# Patient Record
Sex: MALE | Race: WHITE | NOT HISPANIC OR LATINO | Employment: OTHER | URBAN - METROPOLITAN AREA
[De-identification: names, ages, dates, MRNs, and addresses within clinical notes are randomized per-mention and may not be internally consistent; named-entity substitution may affect disease eponyms.]

---

## 2018-05-30 ENCOUNTER — APPOINTMENT (EMERGENCY)
Dept: RADIOLOGY | Facility: HOSPITAL | Age: 53
End: 2018-05-30
Payer: MEDICARE

## 2018-05-30 ENCOUNTER — HOSPITAL ENCOUNTER (EMERGENCY)
Facility: HOSPITAL | Age: 53
Discharge: HOME | End: 2018-05-30
Attending: EMERGENCY MEDICINE | Admitting: EMERGENCY MEDICINE
Payer: MEDICARE

## 2018-05-30 ENCOUNTER — APPOINTMENT (EMERGENCY)
Dept: CT IMAGING | Facility: HOSPITAL | Age: 53
End: 2018-05-30
Payer: MEDICARE

## 2018-05-30 VITALS
HEART RATE: 80 BPM | HEIGHT: 72 IN | SYSTOLIC BLOOD PRESSURE: 154 MMHG | OXYGEN SATURATION: 95 % | BODY MASS INDEX: 29.12 KG/M2 | WEIGHT: 215 LBS | TEMPERATURE: 98.6 F | RESPIRATION RATE: 96 BRPM | DIASTOLIC BLOOD PRESSURE: 76 MMHG

## 2018-05-30 DIAGNOSIS — S93.402A LEFT ANKLE SPRAIN: ICD-10-CM

## 2018-05-30 DIAGNOSIS — M54.50 ACUTE LOW BACK PAIN: ICD-10-CM

## 2018-05-30 DIAGNOSIS — S29.012A UPPER BACK STRAIN: ICD-10-CM
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
## 2018-05-30 LAB
ANION GAP BLD CALC-SCNC: 18 MMOL/L (ref 4–13)
BUN BLD-MCNC: 6 MG/DL (ref 5–25)
CA-I BLD-SCNC: 1.14 MMOL/L (ref 1.12–1.32)
CHLORIDE BLD-SCNC: 101 MMOL/L (ref 100–108)
CREAT BLD-MCNC: 0.9 MG/DL (ref 0.6–1.3)
GFR SERPL CREATININE-BSD FRML MDRD: 96 ML/MIN/1.73SQ M
GLUCOSE SERPL-MCNC: 99 MG/DL (ref 65–140)
HCT VFR BLD CALC: 48 % (ref 36.5–49.3)
HGB BLDA-MCNC: 16.3 G/DL (ref 12–17)
PCO2 BLD: 28 MMOL/L (ref 21–32)
POTASSIUM BLD-SCNC: 4 MMOL/L (ref 3.5–5.3)
SODIUM BLD-SCNC: 142 MMOL/L (ref 136–145)
SPECIMEN SOURCE: ABNORMAL

## 2018-05-30 PROCEDURE — 74177 CT ABD & PELVIS W/CONTRAST: CPT

## 2018-05-30 PROCEDURE — 73610 X-RAY EXAM OF ANKLE: CPT

## 2018-05-30 PROCEDURE — 70450 CT HEAD/BRAIN W/O DYE: CPT

## 2018-05-30 PROCEDURE — 99284 EMERGENCY DEPT VISIT MOD MDM: CPT

## 2018-05-30 PROCEDURE — 73630 X-RAY EXAM OF FOOT: CPT

## 2018-05-30 PROCEDURE — 80047 BASIC METABLC PNL IONIZED CA: CPT

## 2018-05-30 PROCEDURE — 85014 HEMATOCRIT: CPT

## 2018-05-30 PROCEDURE — 71260 CT THORAX DX C+: CPT

## 2018-05-30 PROCEDURE — 96374 THER/PROPH/DIAG INJ IV PUSH: CPT

## 2018-05-30 PROCEDURE — 72125 CT NECK SPINE W/O DYE: CPT

## 2018-05-30 RX ORDER — CYCLOBENZAPRINE HCL 10 MG
10 TABLET ORAL 3 TIMES DAILY PRN
Qty: 15 TABLET | Refills: 0 | Status: SHIPPED | OUTPATIENT
Start: 2018-05-30

## 2018-05-30 RX ADMIN — HYDROMORPHONE HYDROCHLORIDE 0.5 MG: 1 INJECTION, SOLUTION INTRAMUSCULAR; INTRAVENOUS; SUBCUTANEOUS at 14:41

## 2018-05-30 RX ADMIN — IOHEXOL 100 ML: 350 INJECTION, SOLUTION INTRAVENOUS at 15:20

## 2018-05-30 NOTE — ED PROVIDER NOTES
History  Chief Complaint   Patient presents with    Back Pain     Pt reports fall Saturday on steps, denies striking head, reports c/o left ankle pain and right sided back pain     58-year-old male patient here for evaluation of a fall down steps  This occurred 3 days ago  He was walking up the steps when he lost his footing and tripped falling down about 5-6 steps  He states he was able to grab onto the railing with right shoulder but still fell all the way down  He does not remember hitting his head or loss of consciousness but does have a headache  No visual disturbances  He is midline neck pain, right-sided mid thoracic pain  He has pain with deep inspiration  He has lower back pain  No urinary incontinence, saddle anesthesia, difficulty urinating  No history of malignancy  Does not take any anticoagulants or antiplatelets  He also has left ankle pain  He has pain with weight-bearing  He has been ambulating without difficulty however  No numbness or tingling or weakness in his legs  Past medical history of hypertension and RSD  History provided by:  Patient   used: No    Fall   Mechanism of injury: fall    Injury location: Left ankle, left foot, lower back, upper back, neck, head  Incident location: home    Time since incident:  3 days  Arrived directly from scene: no    Fall:     Fall occurred:  Down stairs    Height of fall:  5-6    Impact surface:  Hard floor    Point of impact:  Outstretched arms and back    Entrapped after fall: no    Protective equipment: none    Suspicion of alcohol use: no    Suspicion of drug use: no    Tetanus status:  Unknown  Prior to arrival data:     Bystander interventions:  None  Associated symptoms: back pain, headaches and neck pain    Associated symptoms: no abdominal pain, no blindness, no chest pain, no difficulty breathing, no hearing loss, no loss of consciousness, no nausea, no seizures and no vomiting    Risk factors: no AICD, no anticoagulation therapy, no asthma, no beta blocker therapy, no CABG, no CAD, no CHF, no COPD, no diabetes, no dialysis, no hemophilia, no kidney disease, no pacemaker, no past MI and no steroid use        None       Past Medical History:   Diagnosis Date    Hypertension     RSD (reflex sympathetic dystrophy)        Past Surgical History:   Procedure Laterality Date    BACK SURGERY      SPINAL CORD STIMULATOR IMPLANT         History reviewed  No pertinent family history  I have reviewed and agree with the history as documented  Social History   Substance Use Topics    Smoking status: Never Smoker    Smokeless tobacco: Never Used    Alcohol use No        Review of Systems   Constitutional: Negative for activity change, appetite change, chills, diaphoresis, fatigue, fever and unexpected weight change  HENT: Negative for congestion, hearing loss, rhinorrhea, sinus pressure, sore throat and trouble swallowing  Eyes: Negative for blindness, photophobia and visual disturbance  Respiratory: Negative for apnea, cough, choking, chest tightness, shortness of breath, wheezing and stridor  Cardiovascular: Negative for chest pain, palpitations and leg swelling  Gastrointestinal: Negative for abdominal distention, abdominal pain, blood in stool, constipation, diarrhea, nausea and vomiting  Genitourinary: Negative for decreased urine volume, difficulty urinating, dysuria, enuresis, flank pain, frequency, hematuria and urgency  Musculoskeletal: Positive for back pain and neck pain  Negative for arthralgias, myalgias and neck stiffness  Left ankle pain   Skin: Negative for color change, pallor, rash and wound  Allergic/Immunologic: Negative  Neurological: Positive for headaches  Negative for dizziness, tremors, seizures, loss of consciousness, syncope, weakness, light-headedness and numbness  Hematological: Negative  Psychiatric/Behavioral: Negative      All other systems reviewed and are negative  Physical Exam  Physical Exam   Constitutional: He is oriented to person, place, and time  He appears well-developed and well-nourished  Non-toxic appearance  He does not have a sickly appearance  He does not appear ill  No distress  HENT:   Head: Normocephalic and atraumatic  Eyes: EOM and lids are normal  Pupils are equal, round, and reactive to light  Neck: Normal range of motion  Neck supple  Cardiovascular: Normal rate, regular rhythm, S1 normal, S2 normal, normal heart sounds, intact distal pulses and normal pulses  Exam reveals no gallop, no distant heart sounds, no friction rub and no decreased pulses  No murmur heard  Pulses:       Radial pulses are 2+ on the right side, and 2+ on the left side  Pulmonary/Chest: Effort normal and breath sounds normal  No accessory muscle usage  No apnea, no tachypnea and no bradypnea  No respiratory distress  He has no decreased breath sounds  He has no wheezes  He has no rhonchi  He has no rales  Abdominal: Soft  Normal appearance and bowel sounds are normal  He exhibits no distension and no mass  There is no tenderness  There is no rigidity, no rebound and no guarding  No hernia  Musculoskeletal: He exhibits no edema or deformity  Left ankle: He exhibits normal range of motion, no swelling, no ecchymosis, no deformity and normal pulse  Tenderness  Lateral malleolus and medial malleolus tenderness found  Cervical back: He exhibits tenderness, bony tenderness and pain  He exhibits normal range of motion, no swelling, no edema, no deformity and no laceration  Thoracic back: He exhibits tenderness and bony tenderness  Lumbar back: He exhibits tenderness, bony tenderness and pain  He exhibits normal range of motion, no swelling, no edema, no deformity and no laceration          Back:         Right foot: There is normal range of motion, no tenderness, no bony tenderness, no swelling, normal capillary refill, no crepitus and no deformity  Left foot: There is tenderness and bony tenderness  There is normal range of motion, no swelling, normal capillary refill, no crepitus and no deformity  Feet:    Neurological: He is alert and oriented to person, place, and time  No cranial nerve deficit  GCS eye subscore is 4  GCS verbal subscore is 5  GCS motor subscore is 6  GCS 15  AAOx3  Ambulating in department without difficulty  CN II-XII grossly intact  No focal neuro deficits  Skin: Skin is warm, dry and intact  No rash noted  He is not diaphoretic  No erythema  No pallor  Psychiatric: His speech is normal    Nursing note and vitals reviewed        Vital Signs  ED Triage Vitals [05/30/18 1321]   Temperature Pulse Respirations Blood Pressure SpO2   98 6 °F (37 °C) 100 20 167/88 98 %      Temp Source Heart Rate Source Patient Position - Orthostatic VS BP Location FiO2 (%)   Oral Monitor Sitting Left arm --      Pain Score       Worst Possible Pain           Vitals:    05/30/18 1321 05/30/18 1532   BP: 167/88 146/76   Pulse: 100 88   Patient Position - Orthostatic VS: Sitting Lying       Visual Acuity      ED Medications  Medications   HYDROmorphone (DILAUDID) injection 0 5 mg (0 5 mg Intravenous Given 5/30/18 1441)   iohexol (OMNIPAQUE) 350 MG/ML injection (MULTI-DOSE) 100 mL (100 mL Intravenous Given 5/30/18 1520)       Diagnostic Studies  Results Reviewed     Procedure Component Value Units Date/Time    POCT Chem 8+ [[id number]]  (Abnormal) Collected:  05/30/18 1446    Lab Status:  Final result Updated:  05/30/18 1450     SODIUM, I-STAT 142 mmol/l      Potassium, i-STAT 4 0 mmol/L      Chloride, istat 101 mmol/L      CO2, i-STAT 28 mmol/L      Anion Gap, Istat 18 (H) mmol/L      Calcium, Ionized i-STAT 1 14 mmol/L      BUN, I-STAT 6 mg/dl      Creatinine, i-STAT 0 9 mg/dl      eGFR 96 ml/min/1 73sq m      Glucose, i-STAT 99 mg/dl      Hct, i-STAT 48 %      Hgb, i-STAT 16 3 g/dl      Specimen Type VENOUS CT chest abdomen pelvis w contrast   Final Result by Singh Samuel MD (05/30 8165)         1  No acute CT findings  2   3 mm left lower lobe pulmonary nodule  Based on current Fleischner Society 2017 Guidelines on incidental pulmonary nodule, no routine follow-up is needed if the patient is considered low risk for lung cancer  If the patient is considered high risk    for lung cancer, 12 month follow-up non-contrast chest CT is recommended  Workstation performed: [id number]         CT spine cervical without contrast   Final Result by Singh Samuel MD (05/30 1529)      No acute fracture or dislocation  Workstation performed: [id number]         CT head without contrast   Final Result by Singh Samuel MD (05/30 1525)      No acute intracranial abnormality  Fungating structure in the left maxillary sinus could represent a mass/polyp  Correlate with outpatient nonemergent ENT  Workstation performed: [id number]         XR foot 3+ views LEFT   ED Interpretation by Justin Robles PA-C (05/30 1442)   No acute osseous abnormalities      Final Result by ISRAEL Ray MD (05/30 1439)      No acute osseous abnormality  Workstation performed: [id number]         XR ankle 3+ views LEFT   ED Interpretation by Justin Robles PA-C (05/30 1441)   No acute osseous abnormalities      Final Result by ISRAEL Ray MD (05/30 1439)      No acute osseous abnormality              Workstation performed: [id number]                    Procedures  Procedures       Phone Contacts  ED Phone Contact    ED Course                               MDM  Number of Diagnoses or Management Options  Acute low back pain: new and requires workup  Fall down steps: new and requires workup  Left ankle sprain: new and requires workup  Upper back strain: new and requires workup  Diagnosis management comments: DDx including but not limited to: intracranial injury, cervical injury, intrathoracic injury, intraabdominal injury, extremity injury  Plan:  Fall down multiple steps, multiple traumatic injuries  Will perform CT chest abdomen pelvis, CT head, CT neck, x-ray left ankle and foot  Disposition pending  Analgesia  Amount and/or Complexity of Data Reviewed  Clinical lab tests: ordered and reviewed  Tests in the radiology section of CPT®: ordered and reviewed  Independent visualization of images, tracings, or specimens: yes    Risk of Complications, Morbidity, and/or Mortality  Presenting problems: moderate  Management options: low  General comments: 20-year-old male patient presents to ER for fall down steps with multiple injuries  CT scan reveals no acute abnormalities  Was found have small lung nodule  His pain improved after Dilaudid  Likely contusions and sprain injuries  Laboratory evaluation unremarkable  He has been resting comfortably  Will discharge home with Flexeril for potential that this is muscle spasm from the injury itself  Follow up with his PCP in the next 3-5 days for re-evaluation of his pain and symptoms  Return to ER for worsening symptoms  Patient understands and agrees with the plan  Patient Progress  Patient progress: stable    CritCare Time    Disposition  Final diagnoses:   Fall down steps   Left ankle sprain   Upper back strain   Acute low back pain     Time reflects when diagnosis was documented in both MDM as applicable and the Disposition within this note     Time User Action Codes Description Comment    5/30/2018  4:12 PM Viridiana Nunezita [place] Fall down steps     5/30/2018  4:13 PM Viridiana Smith Add [S62 038E] Left ankle sprain     5/30/2018  4:13 PM Viridiana Smith Add [F96 740R] Upper back strain     5/30/2018  4:13 PM Saritha Stinson Add [M54 5] Acute low back pain       ED Disposition     ED Disposition Condition Comment    Discharge  Vladimir Dean discharge to home/self care      Condition at discharge: Good        Follow-up Information     Follow up With Specialties Details Why Lizett Woodson MD  Call in 1 day for follow up in 3-5 days to re-evaluate your pain and symptoms [address]  [phone]            Patient's Medications   Discharge Prescriptions    CYCLOBENZAPRINE (FLEXERIL) 10 MG TABLET    Take 1 tablet (10 mg total) by mouth 3 (three) times a day as needed for muscle spasms       Start Date: 5/30/2018 End Date: --       Order Dose: 10 mg       Quantity: 15 tablet    Refills: 0     No discharge procedures on file      ED Provider  Electronically Signed by           Oswald Oconnell PA-C  05/30/18 8671

## 2018-05-30 NOTE — ED NOTES
D/c reviewed with pt prior to discharge  Medications discussed  Ambulatory off unit with steady gait        Patricia Merino RN  89/11/37 0552

## 2018-05-30 NOTE — DISCHARGE INSTRUCTIONS
Return to the Emergency Department sooner if increased pain, swelling, numbness, weakness, vomiting, difficulty breathing, redness  Ice, elevate  Acute Low Back Pain   WHAT YOU NEED TO KNOW:   What is acute low back pain? Acute low back pain is sudden discomfort in your lower back area that lasts for up to 6 weeks  The discomfort makes it difficult to tolerate activity  What causes or increases my risk for acute low back pain? Conditions that affect the spine, joints, or muscles can cause back pain  These may include arthritis, spinal stenosis (narrowing of the spinal column), muscle tension, or breakdown of the spinal discs  The following increase your risk of back pain:  · Repeated bending, lifting, or twisting, or lifting heavy items    · Injury from a fall or accident    · Lack of regular physical activity     · Obesity, pregnancy     · Smoking    · Aging    · Driving, sitting, or standing for long periods    · Bad posture while sitting or standing  How is acute low back pain diagnosed? Your healthcare provider will ask about your medical history and examine you  He may ask when you last had low back pain and how it started  Show him where you feel the pain and what makes it feel better or worse  Tell him about the type of pain you have, how bad it is, and how long it lasts  Tell him if your pain worsens at night or when you lie down on your back  How is acute low back pain treated? The goal of treatment is to relieve your pain and help you tolerate activity  Most people with acute lower back pain get better within 4 to 6 weeks  You may need any of the following:  · Medicines:      ¨ Acetaminophen  decreases pain  It is available without a doctor's order  Ask how much to take and how often to take it  Follow directions  Acetaminophen can cause liver damage if not taken correctly  ¨ NSAIDs  help decrease swelling and pain  This medicine is available with or without a doctor's order   NSAIDs can cause stomach bleeding or kidney problems in certain people  If you take blood thinner medicine, always ask your healthcare provider if NSAIDs are safe for you  Always read the medicine label and follow directions  ¨ Prescription pain medicine  may be given  Ask your healthcare provider how to take this medicine safely  ¨ Muscle relaxers  decrease pain by relaxing the muscles in your lower spine  What can I do to prevent low back pain? · Use proper body mechanics  ¨ Bend at the hips and knees when you  objects  Do not bend from the waist  Use your leg muscles as you lift the load  Do not use your back  Keep the object close to your chest as you lift it  Try not to twist or lift anything above your waist     ¨ Change your position often when you stand for long periods of time  Rest one foot on a small box or footrest, and then switch to the other foot often  ¨ Try not to sit for long periods of time  When you do, sit in a straight-backed chair with your feet flat on the floor  Never reach, pull, or push while you are sitting  · Do exercises that strengthen your back muscles  Warm up before you exercise  Ask your healthcare provider the best exercises for you  · Maintain a healthy weight  Ask your healthcare provider how much you should weigh  Ask him to help you create a weight loss plan if you are overweight  How can I take care of myself if I have low back pain? · Stay active  as much as you can without causing more pain  Bed rest could make your back pain worse  Start with some light exercises such as walking  Avoid heavy lifting until your pain is gone  Ask for more information about the activities or exercises that are right for you  · Ice  helps decrease swelling, pain, and muscle spams  Put crushed ice in a plastic bag  Cover it with a towel  Place it on your lower back for 20 to 30 minutes every 2 hours   Do this for about 2 to 3 days after your pain starts, or as directed  · Heat  helps decrease pain and muscle spasms  Start to use heat after treatment with ice has stopped  Use a small towel dampened with warm water or a heating pad, or sit in a warm bath  Apply heat on the area for 20 to 30 minutes every 2 hours for as many days as directed  Alternate heat and ice  When should I contact my healthcare provider? · You have a fever  · You have pain at night or when you rest     · Your pain does not get better with treatment  · You have pain that worsens when you cough or sneeze  · You suddenly feel something pop or snap in your back  · You have questions or concerns about your condition or care  When should I seek immediate care or call 911? · You have severe pain  · You have sudden stiffness and heaviness on both buttocks down to both legs  · You have numbness or weakness in one leg, or pain in both legs  · You have numbness in your genital area or across your lower back  · You cannot control your urine or bowel movements  CARE AGREEMENT:   You have the right to help plan your care  Learn about your health condition and how it may be treated  Discuss treatment options with your caregivers to decide what care you want to receive  You always have the right to refuse treatment  The above information is an  only  It is not intended as medical advice for individual conditions or treatments  Talk to your doctor, nurse or pharmacist before following any medical regimen to see if it is safe and effective for you  © 2017 [employer]  Information is for End User's use only and may not be sold, redistributed or otherwise used for commercial purposes  All illustrations and images included in CareNotes® are the copyrighted property of [facility] [employer]  or [employer]

## 2018-06-04 ENCOUNTER — TELEPHONE (OUTPATIENT)
Dept: PAIN MEDICINE | Facility: MEDICAL CENTER | Age: 53
End: 2018-06-04

## 2018-06-04 NOTE — TELEPHONE ENCOUNTER
Patients wife called to schedule  Intake completed and sent to ES office  Patient being referred from the ED for Back pain  Will arrive early to complete forms

## 2018-06-05 RX ORDER — EPINEPHRINE 0.3 MG/.3ML
INJECTION SUBCUTANEOUS
Refills: 1 | COMMUNITY
Start: 2018-03-27

## 2018-06-05 RX ORDER — NEOMYCIN SULFATE, POLYMYXIN B SULFATE, AND DEXAMETHASONE 3.5; 10000; 1 MG/G; [USP'U]/G; MG/G
OINTMENT OPHTHALMIC
COMMUNITY
Start: 2018-05-14 | End: 2018-06-06 | Stop reason: ALTCHOICE

## 2018-06-06 ENCOUNTER — CONSULT (OUTPATIENT)
Dept: PAIN MEDICINE | Facility: CLINIC | Age: 53
End: 2018-06-06
Payer: MEDICARE

## 2018-06-06 VITALS
HEIGHT: 71 IN | WEIGHT: 216 LBS | RESPIRATION RATE: 16 BRPM | SYSTOLIC BLOOD PRESSURE: 154 MMHG | BODY MASS INDEX: 30.24 KG/M2 | DIASTOLIC BLOOD PRESSURE: 80 MMHG | HEART RATE: 84 BPM

## 2018-06-06 DIAGNOSIS — M48.062 SPINAL STENOSIS OF LUMBAR REGION WITH NEUROGENIC CLAUDICATION: Primary | ICD-10-CM

## 2018-06-06 DIAGNOSIS — M48.02 CERVICAL SPINAL STENOSIS: ICD-10-CM

## 2018-06-06 PROCEDURE — 99204 OFFICE O/P NEW MOD 45 MIN: CPT | Performed by: ANESTHESIOLOGY

## 2018-06-06 RX ORDER — TRAMADOL HYDROCHLORIDE 50 MG/1
50 TABLET ORAL EVERY 8 HOURS PRN
Qty: 21 TABLET | Refills: 0 | Status: SHIPPED | OUTPATIENT
Start: 2018-06-06 | End: 2018-06-13

## 2018-06-06 RX ORDER — METHYLPREDNISOLONE 4 MG/1
TABLET ORAL
Refills: 0 | COMMUNITY
Start: 2018-06-04

## 2018-06-06 RX ORDER — BACLOFEN 20 MG/1
20 TABLET ORAL 3 TIMES DAILY
Refills: 1 | COMMUNITY
Start: 2018-06-04

## 2018-06-06 RX ORDER — HYDROXYZINE HYDROCHLORIDE 10 MG/1
TABLET, FILM COATED ORAL
Refills: 0 | COMMUNITY
Start: 2018-06-04

## 2018-06-06 RX ORDER — DIFLUNISAL 500 MG/1
500 TABLET, FILM COATED ORAL
COMMUNITY
Start: 2018-06-04

## 2018-06-06 NOTE — PROGRESS NOTES
Assessment:  1  Spinal stenosis of lumbar region with neurogenic claudication  Ambulatory referral to Physical Therapy    traMADol (ULTRAM) 50 mg tablet    FL spine and pain procedure    CANCELED: CT lumbar spine without contrast   2  Cervical spinal stenosis  traMADol (ULTRAM) 50 mg tablet       Plan:  The patient is a 61-year-old male who presents today for initial consultation for management of neck, and low back pain and right upper extremity pain secondary to RSD  Patient of note has a history of spinal cord stimulator explant the due to an infection  Acute episodes of pain began following a fall  At this time, I recommended that the patient continue with the course of the Medrol Dosepak  I reviewed his CT scan result of the lumbosacral spine without contrast which demonstrated multilevel degenerative changes with foraminal narrowing  I believe that she would benefit from Lumbar epidural steroid injection to diminish any inflammatory component of his pain  We will initially use an interlaminar approach  The injection may need to be repeated or alternate approach utilized based on the degree of pain relief following the initial injection  In the office today, we reviewed the nature of the patient's pathology in depth using diagrams and models  We discussed the approach we would use for the epidural steroid injection and provided literature for home review  The patient understands the risks associated with the procedure including bleeding, infection, tissue reaction, allergic reaction, spinal headache and paralysis and provided written and verbal consent in the office today  I will send him for PT as well  And I will see patient back in 6 weeks for upon completion of physical therapy  He states that flexeril 10mg does not help with his pain  Of note he is maintained on baclofen 20mg po TID       I will provide him with just a 7 day course of tramadol 50mg po TID until he gets the epidural  Upon completion, no opiates will be prescribed further  Patient verbalized understanding  My impressions and treatment recommendations were discussed in detail with the patient who verbalized understanding and had no further questions  Discharge instructions were provided  I personally saw and examined the patient and I agree with the above discussed plan of care  New Medications Ordered This Visit   Medications    EPINEPHrine (EPIPEN) 0 3 mg/0 3 mL SOAJ     Sig: USE AS DIRECTED IF NEEDED IN THE EVENT OF LIFE THREATENING ALLERGY     Refill:  1    diflunisal (DOLOBID) 500 mg tablet     Sig: Take 500 mg by mouth    hydrOXYzine HCL (ATARAX) 10 mg tablet     Sig: TAKE 1 TAB BY MOUTH EVERY 8 HOURS AS NEEDED FOR ANXIETY  Refill:  0    Methylprednisolone 4 MG TBPK     Sig: Follow package directions     Refill:  0    baclofen 20 mg tablet     Sig: Take 20 mg by mouth 3 (three) times a day     Refill:  1       History of Present Illness:    Tila Rojas is a 45 y o  male Who presents today for initial consultation for management of low back pain, right shoulder pain, neck and right arm pain  Of note, patient sustained a fall back in May 26, 2018  Today he endorses to severe pain which he rates 9/10 on the pain scale  His pain is constant, with no typical pattern  Patient further describes pain as burning, cramping, shooting, sharp with pins and needles sensation  He states upper and lower extremity weakness  Patient also endorses some muscle spasms which she describes as pressure-like and throbbing  His pain is aggravated with lying down, standing, bending, sitting, walking and exercise  Patient was seen in the emergency room on May 30, 2018  He was given Flexeril 10 mg for his pain symptoms  Patient reports that Flexeril does not help  Patient recently saw his primary care physician who placed him on a Medrol Dosepak which is not helping either    However, patient is currently on day 2 of Medrol Dosepak  Of note, patient reports a history of right upper extremity RSD  CT scan of the cervical spine was done in the emergency room which demonstrated mild multilevel degenerative changes without critical canal stenosis  CT scan of the lumbosacral spine without contrast demonstrates multilevel degenerative changes with foraminal stenosis  Of note, patient reports a history of a prior cervical spinal cord stimulator placement which was subsequently explanted due to infection  I have personally reviewed and/or updated the patient's past medical history, past surgical history, family history, social history, current medications, allergies, and vital signs today  Review of Systems:    Review of Systems   Constitutional: Positive for unexpected weight change  Negative for fever  HENT: Positive for trouble swallowing  Eyes: Positive for visual disturbance  Respiratory: Negative for shortness of breath and wheezing  Cardiovascular: Positive for palpitations  Negative for chest pain  Gastrointestinal: Negative for constipation, diarrhea, nausea and vomiting  Endocrine: Negative for cold intolerance, heat intolerance and polydipsia  Genitourinary: Positive for difficulty urinating and frequency  Musculoskeletal: Positive for back pain, gait problem, joint swelling and myalgias  Negative for arthralgias  Skin: Negative for rash  Neurological: Positive for dizziness  Negative for seizures, syncope, weakness and headaches  Hematological: Bruises/bleeds easily  Psychiatric/Behavioral: Positive for dysphoric mood  All other systems reviewed and are negative  There is no problem list on file for this patient        Past Medical History:   Diagnosis Date    Anxiety     Depression     Hypertension     Osteoarthritis     RSD (reflex sympathetic dystrophy)        Past Surgical History:   Procedure Laterality Date    BACK SURGERY      CARPAL TUNNEL RELEASE      INSERTION / Espiridion Jono / REVISION NEUROSTIMULATOR      SHOULDER SURGERY Right 2001, 2002, 2003   SPINAL CORD STIMULATOR IMPLANT         Family History   Problem Relation Age of Onset    No Known Problems Mother     Heart attack Father     No Known Problems Brother     Hypertension Family     Neuropathy Family     Diabetes Family     Skin cancer Family        Social History     Occupational History    disability      Social History Main Topics    Smoking status: Never Smoker    Smokeless tobacco: Never Used    Alcohol use No    Drug use: No    Sexual activity: Not on file       Current Outpatient Prescriptions on File Prior to Visit   Medication Sig    cyclobenzaprine (FLEXERIL) 10 mg tablet Take 1 tablet (10 mg total) by mouth 3 (three) times a day as needed for muscle spasms     No current facility-administered medications on file prior to visit  Allergies   Allergen Reactions    Bee Venom      anaphlaxis       Physical Exam:    /80   Pulse 84   Resp 16   Ht 5' 11" (1 803 m)   Wt 98 kg (216 lb)   BMI 30 13 kg/m²     Constitutional: appears older than stated age    Eyes: anicteric  HEENT: grossly intact  Neck: supple, symmetric, trachea midline and no masses   Pulmonary:even and unlabored  Cardiovascular:No edema or pitting edema present  Skin:Normal without rashes or lesions and well hydrated  Psychiatric:Mood and affect appropriate  Neurologic:Cranial Nerves II-XII grossly intact  Musculoskeletal:ambulates with cane    Lumbar Spine Exam    Appearance:  Normal lordosis  Palpation/Tenderness:  left lumbar paraspinal tenderness  right lumbar paraspinal tenderness  Sensory:  no sensory deficits noted  Range of Motion:  Flexion:  Severely limited  with pain  Extension:  Severely limited  with pain  Motor Strength:  Left foot dorsiflexion:  4/5  Left foot plantar flexion:  4/5  Right foot dorsiflexion:  4/5  Right foot plantar flexion:  4/5  Reflexes:  Left Patellar:  2+   Right Patellar:  2+      Cervical Spine Exam    Appearance:  Normal lordosis  Palpation/Tenderness:  left cervical paraspinal tenderness  right cervical paraspinal tenderness  Sensory:  diminished pin prick sensation, location: right arm  dimished light touch sensation, location: right arm  Range of Motion:  Flexion: Moderately limited  with pain  Extension:  Severely limited  with pain  Motor Strength:  Left    5/5  Right   3/5  Reflexes:  Left Patellar:  1+   Right Patellar:  1+   Special Tests:  Left Spurlings:  positive        Imaging  CT LUMBAR SPINE WITHOUT CONTRAST - 3/31/2017 12:38 pm    HISTORY  Foraminal stenosis has neck leads in place    COMPARISON  None    TECHNIQUE  Axial scanning performed through the lumbar spine at 2 5 mm intervals   Coronal and sagittal reconstructions  FINDINGS  There are 5 lumbar type vertebrae   There is roughly 8 degrees of dextroscoliosis centered at L1-2   Normal   lumbar lordosis is preserved   There is slight retrolisthesis at L1-2  The L5-S1 disc is well preserved while all the other lumbar discs are narrowed   There is endplate spurring   most prominent at L1-2   There are scattered Schmorl's nodes  There is mild neuroforaminal encroachment at L1-2 and L4-5   Facet hypertrophy and disc bulging results mild   spinal stenosis at L3-4  No evidence of spondylolysis or compression fractures   Paraspinal soft tissues are unremarkable  IMPRESSION  Scattered degenerative changes as described  Mild neuroforaminal encroachment at L1-2 and L4-5  Mild acquired spinal stenosis at L3-4      Authenticated [address] [place] [address] Providers(s)  Masoud Johnson MD             [id number]               Camilo Thakkar MD   Status

## 2018-06-13 ENCOUNTER — TELEPHONE (OUTPATIENT)
Dept: PAIN MEDICINE | Facility: MEDICAL CENTER | Age: 53
End: 2018-06-13

## 2018-06-13 NOTE — TELEPHONE ENCOUNTER
Clay Bills from [address] [place] [address] PT called asking if [facility] office received a PT eval form regarding pt  PT has the wrong fax #, back fax # was given, she would like a call back to verify [facility] office received form   Clay Bills can be reached at [phone]

## 2022-08-22 ENCOUNTER — TELEPHONE (OUTPATIENT)
Dept: VASCULAR SURGERY | Facility: CLINIC | Age: 57
End: 2022-08-22

## 2022-10-07 ENCOUNTER — TELEPHONE (OUTPATIENT)
Dept: VASCULAR SURGERY | Facility: CLINIC | Age: 57
End: 2022-10-07

## 2022-10-10 ENCOUNTER — OFFICE VISIT (OUTPATIENT)
Dept: VASCULAR SURGERY | Facility: CLINIC | Age: 57
End: 2022-10-10
Payer: MEDICARE

## 2022-10-10 VITALS
BODY MASS INDEX: 33.32 KG/M2 | HEIGHT: 71 IN | WEIGHT: 238 LBS | DIASTOLIC BLOOD PRESSURE: 70 MMHG | SYSTOLIC BLOOD PRESSURE: 106 MMHG | TEMPERATURE: 97.3 F | HEART RATE: 70 BPM

## 2022-10-10 DIAGNOSIS — M79.604 PAIN IN BOTH LOWER EXTREMITIES: Primary | ICD-10-CM

## 2022-10-10 DIAGNOSIS — M79.605 PAIN IN BOTH LOWER EXTREMITIES: Primary | ICD-10-CM

## 2022-10-10 PROBLEM — M79.606 LEG PAIN: Status: ACTIVE | Noted: 2022-10-10

## 2022-10-10 PROCEDURE — 99205 OFFICE O/P NEW HI 60 MIN: CPT | Performed by: SURGERY

## 2022-10-10 RX ORDER — GABAPENTIN 600 MG/1
600 TABLET ORAL 3 TIMES DAILY
COMMUNITY
Start: 2022-08-26

## 2022-10-10 RX ORDER — BENAZEPRIL HYDROCHLORIDE 20 MG/1
20 TABLET ORAL EVERY MORNING
COMMUNITY
Start: 2022-08-19

## 2022-10-10 RX ORDER — FUROSEMIDE 20 MG/1
TABLET ORAL
COMMUNITY
Start: 2022-09-22

## 2022-10-10 NOTE — LETTER
October 10, 2022     Nicole Andrews DO  [address] [address] [facility]  [address]    Patient: Silvia Mahmood   YOB: 1965   Date of Visit: 10/10/2022       Dear Dr Alecia Garcia: Thank you for referring Lio Velasco to me for evaluation  Below are my notes for this consultation  If you have questions, please do not hesitate to call me  I look forward to following your patient along with you  Sincerely,        Sherif Bone DO        CC: No Recipients  Sherif Bone DO  10/10/2022  2:23 PM  Sign when Signing Visit  Assessment/Plan:    Leg pain  Does not seem vascular in nature  Will order DIANNA and venous insufficiency duplex  RTC 3 months with Vascular  Knee high compression stockings       Diagnoses and all orders for this visit:    Pain in both lower extremities  -     VAS lower limb arterial duplex, complete bilateral; Future  -     VAS Lower extremity venous insufficiency duplex, bilateral w/ measurements; Future  -     Compression Stocking    Other orders  -     benazepril (LOTENSIN) 20 mg tablet; Take 20 mg by mouth every morning  -     furosemide (LASIX) 20 mg tablet; TAKE 1 TABLET DAILY AS NEEDED SWELLING IN THE LEGS FOR FLUID ACCUMULATION OR WEIGHT GAIN  -     gabapentin (NEURONTIN) 600 MG tablet; Take 600 mg by mouth 3 (three) times a day          Subjective:      Patient ID: Silvia Mahmood is a 59 y o  male  New patient, presents today for evaluation of leg swelling  LEV done 8/9 at outside facility  Patient also reports b/l leg pain/burning w/ walking very short distance  No wounds  Leg pain is worse at night and not relieved at rest  He has had some swelling, but not at this time and never had issues with leg wounds or varicosities  No prior arterial or venous procedures      HPI    The following portions of the patient's history were reviewed and updated as appropriate: allergies, current medications, past family history, past medical history, past social history, past surgical history and problem list     Review of Systems   Constitutional: Negative  HENT: Negative  Eyes: Negative  Respiratory: Negative  Cardiovascular: Positive for leg swelling  Gastrointestinal: Negative  Endocrine: Negative  Genitourinary: Negative  Musculoskeletal: Positive for arthralgias, back pain and gait problem  Leg pain  Leg cramping with walking   Skin: Negative  Allergic/Immunologic: Negative  Hematological: Negative  Psychiatric/Behavioral: Negative  I have reviewed the ROS above and made changes as needed  Objective:      /70 (BP Location: Left arm, Patient Position: Sitting)   Pulse 70   Temp (!) 97 3 °F (36 3 °C) (Tympanic)   Ht 5' 11" (1 803 m)   Wt 108 kg (238 lb)   BMI 33 19 kg/m²          Physical Exam      General  Exam: alert, awake, oriented, no distress, consistent with stated age    Integumentary  Exam: warm, dry, no gross lesions, no bruises and normal color    Head and Neck  Exam: supple, no bruits, trachea midline, no JVD, no mass or palpable nodes    Eye  Exam: extraoccular movements intact, no scleral icterus, sclera clear, pupils equal round and reactive to light    ENMT  Exam: oral mucosa pink and moist    Chest and Lung  Exam: chest normal without deformity, bilaterally expansive, clear to auscultation    Cardiovascular  Exam: regular rate, regular rhythm, no murmurs, no rubs or gallops    Adbomen  Exam: soft, non-tender, non-distended, no pulsatile abdominal masses, no abdominal bruit    Peripheral Vascular  Exam: no clubbing of the digits of the upper extremity, no cyanosis, no edema, both feet are warm, radial pulses 2+ bilaterally, skin well perfused, without and no varicosities      No widened popliteal pulse noted bilaterally    Upper Extremity:  Palpation: Radial pulse- Bilateral 2+    Lower Extremity:  Palpation: Femoral pulse- Bilateral 2+         Popliteal pulse - Bilateral 2+        Dorsalis Pedis - Bilateral 2+        Posterior tibial - Bilateral 2+    Neurologic  Exam:alert, non-focal, oriented x 3, cranial nerves II-XII grossly intact

## 2022-10-10 NOTE — PROGRESS NOTES
Assessment/Plan:    Leg pain  Does not seem vascular in nature  Will order DIANNA and venous insufficiency duplex  RTC 3 months with Vascular  Knee high compression stockings       Diagnoses and all orders for this visit:    Pain in both lower extremities  -     VAS lower limb arterial duplex, complete bilateral; Future  -     VAS Lower extremity venous insufficiency duplex, bilateral w/ measurements; Future  -     Compression Stocking    Other orders  -     benazepril (LOTENSIN) 20 mg tablet; Take 20 mg by mouth every morning  -     furosemide (LASIX) 20 mg tablet; TAKE 1 TABLET DAILY AS NEEDED SWELLING IN THE LEGS FOR FLUID ACCUMULATION OR WEIGHT GAIN  -     gabapentin (NEURONTIN) 600 MG tablet; Take 600 mg by mouth 3 (three) times a day          Subjective:      Patient ID: Go Evans is a 59 y o  male  New patient, presents today for evaluation of leg swelling  LEV done 8/9 at outside facility  Patient also reports b/l leg pain/burning w/ walking very short distance  No wounds  Leg pain is worse at night and not relieved at rest  He has had some swelling, but not at this time and never had issues with leg wounds or varicosities  No prior arterial or venous procedures  HPI    The following portions of the patient's history were reviewed and updated as appropriate: allergies, current medications, past family history, past medical history, past social history, past surgical history and problem list     Review of Systems   Constitutional: Negative  HENT: Negative  Eyes: Negative  Respiratory: Negative  Cardiovascular: Positive for leg swelling  Gastrointestinal: Negative  Endocrine: Negative  Genitourinary: Negative  Musculoskeletal: Positive for arthralgias, back pain and gait problem  Leg pain  Leg cramping with walking   Skin: Negative  Allergic/Immunologic: Negative  Hematological: Negative  Psychiatric/Behavioral: Negative            I have reviewed the ROS above and made changes as needed  Objective:      /70 (BP Location: Left arm, Patient Position: Sitting)   Pulse 70   Temp (!) 97 3 °F (36 3 °C) (Tympanic)   Ht 5' 11" (1 803 m)   Wt 108 kg (238 lb)   BMI 33 19 kg/m²          Physical Exam      General  Exam: alert, awake, oriented, no distress, consistent with stated age    Integumentary  Exam: warm, dry, no gross lesions, no bruises and normal color    Head and Neck  Exam: supple, no bruits, trachea midline, no JVD, no mass or palpable nodes    Eye  Exam: extraoccular movements intact, no scleral icterus, sclera clear, pupils equal round and reactive to light    ENMT  Exam: oral mucosa pink and moist    Chest and Lung  Exam: chest normal without deformity, bilaterally expansive, clear to auscultation    Cardiovascular  Exam: regular rate, regular rhythm, no murmurs, no rubs or gallops    Adbomen  Exam: soft, non-tender, non-distended, no pulsatile abdominal masses, no abdominal bruit    Peripheral Vascular  Exam: no clubbing of the digits of the upper extremity, no cyanosis, no edema, both feet are warm, radial pulses 2+ bilaterally, skin well perfused, without and no varicosities      No widened popliteal pulse noted bilaterally    Upper Extremity:  Palpation: Radial pulse- Bilateral 2+    Lower Extremity:  Palpation: Femoral pulse- Bilateral 2+         Popliteal pulse - Bilateral 2+        Dorsalis Pedis - Bilateral 2+        Posterior tibial - Bilateral 2+    Neurologic  Exam:alert, non-focal, oriented x 3, cranial nerves II-XII grossly intact

## 2022-10-10 NOTE — PATIENT INSTRUCTIONS
DIANNA and venous insufficiency duplex  Leg pain does not seem vascular in nature, would suggest re-evaluation by PCP for other etiologies of leg pain  Trial of compression stockings   RTC 3 months to discuss results after testing complete

## 2022-10-10 NOTE — ASSESSMENT & PLAN NOTE
Does not seem vascular in nature  Will order DIANNA and venous insufficiency duplex  RTC 3 months with Vascular  Knee high compression stockings

## 2023-03-21 ENCOUNTER — TELEPHONE (OUTPATIENT)
Dept: VASCULAR SURGERY | Facility: CLINIC | Age: 58
End: 2023-03-21

## 2023-03-21 NOTE — TELEPHONE ENCOUNTER
Attempted to contact patient to schedule appointment(s) listed below.  Unable to leave voicemail because no voice mail set up.    Patient's appointment(s) are past due, expected ASAP.    Dopplers  [] Abdominal Aorta Iliac (AOIL)  [] Carotid (CV)   [] Celiac and/or Mesenteric  [] Endovascular Aortic Repair (EVAR)   [] Hemodialysis Access (HD)   [x] Lower Limb Arterial (SHIRLEY) needs to be r/s from no show   [] Lower Limb Venous (LEV)  [x] Lower Limb Venous Duplex with Reflux (LEVDR) needs to be r/s from no show  [] Renal Artery  [] Upper Limb Arterial (UEA)    [] Upper Limb Venous (UEV)              [] DIANNA and Waveform analysis     Advanced Imaging   [] CTA head/neck    [] CTA abdomen    [] CTA abdomen & pelvis    [] CT abdomen with/ without contrast  [] CT abdomen with contrast  [] CT abdomen without contrast    [] CT abdomen & pelvis with/ without contrast  [] CT abdomen & pelvis with contrast  [] CT abdomen & pelvis without contrast    Office Visit   [] New patient, patient last seen over 3 years ago  [] Risk factor modification (RFM)   [x] Follow up Saw Fey 10/10/22 [place]   [] Lost to follow up ([facility])

## 2024-08-09 ENCOUNTER — TELEPHONE (OUTPATIENT)
Dept: URGENT CARE | Facility: CLINIC | Age: 59
End: 2024-08-09

## 2024-08-09 ENCOUNTER — APPOINTMENT (OUTPATIENT)
Dept: RADIOLOGY | Facility: CLINIC | Age: 59
End: 2024-08-09
Payer: MEDICARE

## 2024-08-09 ENCOUNTER — OFFICE VISIT (OUTPATIENT)
Dept: URGENT CARE | Facility: CLINIC | Age: 59
End: 2024-08-09
Payer: MEDICARE

## 2024-08-09 VITALS
HEART RATE: 70 BPM | RESPIRATION RATE: 18 BRPM | TEMPERATURE: 97.2 F | DIASTOLIC BLOOD PRESSURE: 89 MMHG | OXYGEN SATURATION: 98 % | SYSTOLIC BLOOD PRESSURE: 162 MMHG

## 2024-08-09 DIAGNOSIS — M79.602 LEFT ARM PAIN: Primary | ICD-10-CM

## 2024-08-09 DIAGNOSIS — M79.602 LEFT ARM PAIN: ICD-10-CM

## 2024-08-09 PROCEDURE — 73080 X-RAY EXAM OF ELBOW: CPT

## 2024-08-09 PROCEDURE — G0463 HOSPITAL OUTPT CLINIC VISIT: HCPCS | Performed by: PHYSICIAN ASSISTANT

## 2024-08-09 PROCEDURE — 73110 X-RAY EXAM OF WRIST: CPT

## 2024-08-09 PROCEDURE — 73130 X-RAY EXAM OF HAND: CPT

## 2024-08-09 PROCEDURE — 73090 X-RAY EXAM OF FOREARM: CPT

## 2024-08-09 PROCEDURE — 99204 OFFICE O/P NEW MOD 45 MIN: CPT | Performed by: PHYSICIAN ASSISTANT

## 2024-08-09 RX ORDER — HYDROXYZINE PAMOATE 50 MG/1
CAPSULE ORAL
COMMUNITY
Start: 2024-06-17

## 2024-08-09 RX ORDER — TRAZODONE HYDROCHLORIDE 100 MG/1
200 TABLET ORAL
COMMUNITY
Start: 2024-05-28

## 2024-08-09 NOTE — TELEPHONE ENCOUNTER
"Patient's wife called at approximately [phone] screaming because she just received the voicemail that was left by myself approximately 5 minutes after the patient walked out before allowing nursing to apply an ace wrap. I explained to her I was not aware there was thumb spica braces available here but once I was made aware I immediately called the patient. Patients wife is upset that he was not given pain medication and feels \" I did nothing for him and am just shipping him off to orthopedics\"   "

## 2024-08-09 NOTE — PROGRESS NOTES
[facility]        NAME: Quintin Cabral is a 58 y.o. male  : 1965    MRN: [id number]  DATE: 2024  TIME: 2:56 PM    Assessment and Plan   Left arm pain [M79.602]  1. Left arm pain  XR hand 3+ vw left    XR wrist 3+ vw left    XR forearm 2 vw left    XR elbow 3+ vw left    Ambulatory Referral to Orthopedic Surgery            Patient Instructions   Discussed with patient using Tylenol and ibuprofen, rest and ice for pain relief.  We discussed that there is no fracture noted on x-ray.  Patient notes he is in a lot of pain and is requesting to be splinted.  I discussed that splinting was not necessary as there is no fracture noted.  I did offer an Ace wrap but patient left without Ace wrap being applied.  Ortho referral placed.  At this time I am unable to manipulate patient's thumb to check for UCL injury as patient is significantly tender. We discussed that he should follow up with orthopedics. Patient seemed to leave unhappy. I Did call him within minutes after he left offering a thumb spica brace but patient did not  and a VM was left.   If tests have been performed at [facility], our office will contact you with results if changes need to be made to the care plan discussed with you at the visit.  You can review your full results on [facility]  Follow up with PCP in 3-5 days.  Proceed to  ER if symptoms worsen.    Chief Complaint     Chief Complaint   Patient presents with    Arm Pain     Patient here after falling yesterday and landing on his left hand. He states when he fell his thumb bent back. The pain is in his hand and radiating up into his arm. He denies hitting his head.          History of Present Illness       HPI  This is a 58-year-old male here complaining of left arm pain.  He notes yesterday he fell on an outstretched hand and feels like he bent his thumb and all his fingers backward.  He currently rates his pain 9 and 10.  He did not take any medicines for his  pain.  He notes he has tried ice heat and IcyHot cream without relief.  He denies any numbness or tingling in the fingers, fever, shortness of breath or chest pain.  Patient denies hitting his head, loss conscious or anticoagulation use.  Review of Systems   Review of Systems   Constitutional:  Negative for fever.   Respiratory:  Negative for shortness of breath.    Cardiovascular:  Negative for chest pain.   Musculoskeletal:  Positive for arthralgias.         Current Medications       Current Outpatient Medications:     baclofen 20 mg tablet, Take 20 mg by mouth 3 (three) times a day, Disp: , Rfl: 1    benazepril (LOTENSIN) 20 mg tablet, Take 20 mg by mouth every morning, Disp: , Rfl:     EPINEPHrine (EPIPEN) 0.3 mg/0.3 mL SOAJ, USE AS DIRECTED IF NEEDED IN THE EVENT OF LIFE THREATENING ALLERGY, Disp: , Rfl: 1    gabapentin (NEURONTIN) 600 MG tablet, Take 600 mg by mouth 3 (three) times a day, Disp: , Rfl:     hydrOXYzine pamoate (VISTARIL) 50 mg capsule, TAKE 1 CAPSULE BY MOUTH EVERY DAY AT BEDTIME AS NEEDED FOR INSOMNIA, Disp: , Rfl:     traZODone (DESYREL) 100 mg tablet, Take 200 mg by mouth daily at bedtime, Disp: , Rfl:     cyclobenzaprine (FLEXERIL) 10 mg tablet, Take 1 tablet (10 mg total) by mouth 3 (three) times a day as needed for muscle spasms (Patient not taking: Reported on 10/10/2022), Disp: 15 tablet, Rfl: 0    diflunisal (DOLOBID) 500 mg tablet, Take 500 mg by mouth (Patient not taking: Reported on 8/9/2024), Disp: , Rfl:     furosemide (LASIX) 20 mg tablet, TAKE 1 TABLET DAILY AS NEEDED SWELLING IN THE LEGS FOR FLUID ACCUMULATION OR WEIGHT GAIN (Patient not taking: Reported on 8/9/2024), Disp: , Rfl:     hydrOXYzine HCL (ATARAX) 10 mg tablet, TAKE 1 TAB BY MOUTH EVERY 8 HOURS AS NEEDED FOR ANXIETY. (Patient not taking: Reported on 8/9/2024), Disp: , Rfl: 0    Methylprednisolone 4 MG TBPK, Follow package directions (Patient not taking: Reported on 8/9/2024), Disp: , Rfl: 0    Current Allergies      Allergies as of 08/09/2024 - Reviewed 08/09/2024   Allergen Reaction Noted    Bee venom  07/06/2004            The following portions of the patient's history were reviewed and updated as appropriate: allergies, current medications, past family history, past medical history, past social history, past surgical history and problem list.     Past Medical History:   Diagnosis Date    Anxiety     Depression     Hypertension     Osteoarthritis     RSD (reflex sympathetic dystrophy)        Past Surgical History:   Procedure Laterality Date    BACK SURGERY      CARPAL TUNNEL RELEASE      INSERTION / PLACEMENT / REVISION NEUROSTIMULATOR      SHOULDER SURGERY Right 2001, 2002, 2003.    SPINAL CORD STIMULATOR IMPLANT         Family History   Problem Relation Age of Onset    No Known Problems Mother     Heart attack Father     No Known Problems Brother     Hypertension Family     Neuropathy Family     Diabetes Family     Skin cancer Family          Medications have been verified.        Objective   /89   Pulse 70   Temp (!) 97.2 °F (36.2 °C)   Resp 18   SpO2 98%        Physical Exam     Physical Exam  Vitals and nursing note reviewed.   Cardiovascular:      Rate and Rhythm: Normal rate and regular rhythm.   Pulmonary:      Effort: Pulmonary effort is normal.      Breath sounds: Normal breath sounds.   Musculoskeletal:      Left elbow: No swelling. Normal range of motion. Tenderness present.      Left forearm: Tenderness present. No swelling or deformity.      Left wrist: Tenderness present. No swelling. Normal range of motion.      Left hand: Tenderness present. No swelling, deformity or lacerations. Normal range of motion. Normal sensation. Normal capillary refill.      Comments: Patient has pain to palpation diffusely along the left thumb.  He also has pain to palpation over all MCP joints.  There is no swelling, ecchymosis or erythema of the hand.  Patient complains of pain to palpation of the wrist diffusely,  forearm diffusely and elbow diffusely.  There is no swelling, ecchymosis or erythema of any of these locations.  Patient has full range of motion of the left elbow.  Patient has full range of motion of the left hand with pain.  Patient has full range of motion of the left wrist with pain. Thumb is unable to be manipulated to check for UCL injury

## 2024-08-12 ENCOUNTER — TELEPHONE (OUTPATIENT)
Dept: URGENT CARE | Facility: CLINIC | Age: 59
End: 2024-08-12

## 2024-08-12 NOTE — PROGRESS NOTES
Called patient multiple times before getting in contact with his wife, Altagracia Cabral, to follow-up and reassess patient and review x-ray findings regarding the left hand and thumb.  Patient's wife, Altagracia states that he is still experiencing severe pain in the left hand and thumb.  She states that they did purchase a thumb spica splint and have been keeping his left thumb immobilized.  I did offer to have patient return to place a thumb spica splint with [facility] at our clinic, however I did also advise that if patient is experiencing severe, uncontrolled pain that he should proceed directly to the emergency room for evaluation and pain control.  Patient's wife, Altagracia voiced understanding of these recommendations, and stated that she will take her  to the emergency room for further evaluation.    Spoke with patient's wife, Altagracia on 8/12/2024, at approximately 3:05 PM.

## 2024-08-13 NOTE — TELEPHONE ENCOUNTER
----- Message from Becky Pryor PA-C sent at 8/12/2024  6:09 PM EDT -----  Regarding: please call patient  If you could please call this patient and inform of xray results and offer splint/brace. Dr. Quinteros told me he called him early and no one picked up. I do not know if he left a message or not. Unfortunately his xray was accidentally doned and he was not informed of the results or offered to return for splinting.   Thank you

## 2024-08-13 NOTE — TELEPHONE ENCOUNTER
I called and spoke to the patient's wife.  Informed him of the x-ray findings of the thumb distal phalanx fracture.  She reported to me that they already placed the splint on the patient's left thumb that did immobilize the IP joint after described to them what the IP joint was they were able to confirm with me that this joint was immobilized via the splint that they had purchased.  They declined coming in to be placed into a splint by us.  I did discuss that a orthopedic follow-up would be indicated for this they reported they have seen their PCP and are going to continue with management through them.